# Patient Record
Sex: MALE | Race: WHITE | Employment: STUDENT | URBAN - METROPOLITAN AREA
[De-identification: names, ages, dates, MRNs, and addresses within clinical notes are randomized per-mention and may not be internally consistent; named-entity substitution may affect disease eponyms.]

---
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
## 2021-05-11 ENCOUNTER — OFFICE VISIT (OUTPATIENT)
Dept: DERMATOLOGY | Age: 21
End: 2021-05-11
Payer: COMMERCIAL

## 2021-05-11 VITALS — TEMPERATURE: 98.2 F

## 2021-05-11 DIAGNOSIS — L50.5 CHOLINERGIC URTICARIA: Primary | ICD-10-CM

## 2021-05-11 DIAGNOSIS — L70.0 ACNE VULGARIS: ICD-10-CM

## 2021-05-11 PROCEDURE — 99213 OFFICE O/P EST LOW 20 MIN: CPT | Performed by: DERMATOLOGY

## 2021-05-11 NOTE — PROGRESS NOTES
sense of health. Skin: No irritation/erythema, no blisters. Past Medical History, Family History, Surgical History, Medications and Allergies reviewed. No outpatient medications have been marked as taking for the 5/11/21 encounter (Office Visit) with Karen Luo MD.     No Known Allergies    Past Medical History:   Diagnosis Date    Ankle fracture, left 12/2011    Ankle fracture, right 10/2012    Buckle fracture of radius     Concussion 5/2012, 1/2012     Past Surgical History:   Procedure Laterality Date    APPENDECTOMY      KNEE ARTHROSCOPY Right 03/14/13       Physical Examination     Gen, well-appearing    Face clear overall  Arms clear but photos with pink/erythematous small macules and urticarial papules      Baseline prior to isotretinoin:        Assessment and Plan     1. Acne vulgaris, clear now  - call if flaring - resume BP products/clinda if needed    2. Eruption and hx c/w cholinergic urticaria  - no e/o EI anaphylaxis  - discussed triggers - heat, sweating, exercise  - rec start daily non-sedating antihistamine - zyrtec daily - take even if not flaring and see if stays clear over the summer  - can try alt antihistamines - xyzal - if still flaring    F/u 2-3 mos prn or in 1 year if staying clear.
